# Patient Record
Sex: FEMALE | Race: WHITE | NOT HISPANIC OR LATINO | URBAN - METROPOLITAN AREA
[De-identification: names, ages, dates, MRNs, and addresses within clinical notes are randomized per-mention and may not be internally consistent; named-entity substitution may affect disease eponyms.]

---

## 2020-06-18 ENCOUNTER — OFFICE VISIT (OUTPATIENT)
Dept: URBAN - METROPOLITAN AREA CLINIC 128 | Facility: CLINIC | Age: 20
End: 2020-06-18
Payer: COMMERCIAL

## 2020-06-18 DIAGNOSIS — K31.84 GASTROPARESIS: ICD-10-CM

## 2020-06-18 DIAGNOSIS — R11.2 NAUSEA & VOMITING: ICD-10-CM

## 2020-06-18 DIAGNOSIS — R10.84 ABDOMINAL CRAMPING, GENERALIZED: ICD-10-CM

## 2020-06-18 DIAGNOSIS — K55.1 SUPERIOR MESENTERIC ARTERY SYNDROME: ICD-10-CM

## 2020-06-18 PROCEDURE — 99204 OFFICE O/P NEW MOD 45 MIN: CPT | Performed by: INTERNAL MEDICINE

## 2020-06-18 PROCEDURE — 99244 OFF/OP CNSLTJ NEW/EST MOD 40: CPT | Performed by: INTERNAL MEDICINE

## 2020-06-18 RX ORDER — ONDANSETRON 4 MG/1
1 TABLET ON THE TONGUE AND ALLOW TO DISSOLVE TABLET, ORALLY DISINTEGRATING ORAL ONCE A DAY
Status: ACTIVE | COMMUNITY

## 2020-06-18 RX ORDER — CLONAZEPAM 0.5 MG/1
1 TABLET AT BEDTIME TABLET ORAL ONCE A DAY
Status: ACTIVE | COMMUNITY

## 2020-06-18 RX ORDER — PANTOPRAZOLE SODIUM 40 MG/1
1 TABLET TABLET, DELAYED RELEASE ORAL ONCE A DAY
Status: ACTIVE | COMMUNITY

## 2020-06-18 RX ORDER — HYOSCYAMINE SULFATE 0.12 MG/1
1 TABLET UNDER THE TONGUE AND ALLOW TO DISSOLVE  AS NEEDED TABLET, ORALLY DISINTEGRATING ORAL THREE TIMES A DAY
Qty: 30 | Refills: 1 | OUTPATIENT
Start: 2020-06-18

## 2020-06-18 NOTE — HPI-TODAY'S VISIT:
hx of abd pain years ago. diagnosed with sma syndrome and was told to gain weight she was up to 120 and was asymptomatic  recently had issues with n/v with hospitalization at [facility] x 3 days she had a BS and was told she has gastroparesis hx of psbo in the past she was not started on any promotility agent recent work up with normal h/h at 15.1/44.6.  normal lft.  cta with no obvious sma obstruction/stricture she was given reglan but had severe restlessness/muscle spasm with it and stopped it  currently feeling well eats small meals and notes no n/v no abd pain today. she is up 5 lb since occ constipation but mostly normal stools.   no recent nsaid use  no over gi bleed.

## 2020-07-10 ENCOUNTER — LAB OUTSIDE AN ENCOUNTER (OUTPATIENT)
Dept: URBAN - METROPOLITAN AREA CLINIC 80 | Facility: CLINIC | Age: 20
End: 2020-07-10

## 2020-07-31 ENCOUNTER — OFFICE VISIT (OUTPATIENT)
Dept: URBAN - METROPOLITAN AREA SURGERY CENTER 31 | Facility: SURGERY CENTER | Age: 20
End: 2020-07-31

## 2020-07-31 ENCOUNTER — OFFICE VISIT (OUTPATIENT)
Dept: URBAN - METROPOLITAN AREA SURGERY CENTER 31 | Facility: SURGERY CENTER | Age: 20
End: 2020-07-31
Payer: COMMERCIAL

## 2020-07-31 DIAGNOSIS — K29.60 ADENOPAPILLOMATOSIS GASTRICA: ICD-10-CM

## 2020-07-31 DIAGNOSIS — R11.2 ACUTE NAUSEA WITH NONBILIOUS VOMITING: ICD-10-CM

## 2020-07-31 PROCEDURE — 43239 EGD BIOPSY SINGLE/MULTIPLE: CPT | Performed by: INTERNAL MEDICINE

## 2020-07-31 PROCEDURE — G8907 PT DOC NO EVENTS ON DISCHARG: HCPCS | Performed by: INTERNAL MEDICINE

## 2020-08-09 ENCOUNTER — WEB ENCOUNTER (OUTPATIENT)
Dept: URBAN - METROPOLITAN AREA CLINIC 128 | Facility: CLINIC | Age: 20
End: 2020-08-09

## 2020-08-17 ENCOUNTER — OFFICE VISIT (OUTPATIENT)
Dept: URBAN - METROPOLITAN AREA CLINIC 128 | Facility: CLINIC | Age: 20
End: 2020-08-17
Payer: COMMERCIAL

## 2020-08-17 ENCOUNTER — WEB ENCOUNTER (OUTPATIENT)
Dept: URBAN - METROPOLITAN AREA CLINIC 128 | Facility: CLINIC | Age: 20
End: 2020-08-17

## 2020-08-17 DIAGNOSIS — K92.1 HEMATOCHEZIA: ICD-10-CM

## 2020-08-17 DIAGNOSIS — I77.1 SMA STENOSIS: ICD-10-CM

## 2020-08-17 DIAGNOSIS — K59.01 CONSTIPATION BY DELAYED COLONIC TRANSIT: ICD-10-CM

## 2020-08-17 DIAGNOSIS — R11.2 NAUSEA AND VOMITING: ICD-10-CM

## 2020-08-17 PROCEDURE — G9903 PT SCRN TBCO ID AS NON USER: HCPCS | Performed by: INTERNAL MEDICINE

## 2020-08-17 PROCEDURE — 99214 OFFICE O/P EST MOD 30 MIN: CPT | Performed by: INTERNAL MEDICINE

## 2020-08-17 PROCEDURE — G8420 CALC BMI NORM PARAMETERS: HCPCS | Performed by: INTERNAL MEDICINE

## 2020-08-17 PROCEDURE — G8427 DOCREV CUR MEDS BY ELIG CLIN: HCPCS | Performed by: INTERNAL MEDICINE

## 2020-08-17 RX ORDER — ONDANSETRON 4 MG/1
1 TABLET ON THE TONGUE AND ALLOW TO DISSOLVE TABLET, ORALLY DISINTEGRATING ORAL ONCE A DAY
Status: ACTIVE | COMMUNITY

## 2020-08-17 RX ORDER — CLONAZEPAM 0.5 MG/1
1 TABLET AT BEDTIME TABLET ORAL ONCE A DAY
Status: DISCONTINUED | COMMUNITY

## 2020-08-17 RX ORDER — HYOSCYAMINE SULFATE 0.12 MG/1
1 TABLET UNDER THE TONGUE AND ALLOW TO DISSOLVE  AS NEEDED TABLET, ORALLY DISINTEGRATING ORAL THREE TIMES A DAY
Qty: 30 | Refills: 1 | Status: DISCONTINUED | COMMUNITY
Start: 2020-06-18

## 2020-08-17 RX ORDER — PANTOPRAZOLE SODIUM 40 MG/1
1 TABLET TABLET, DELAYED RELEASE ORAL ONCE A DAY
Status: ACTIVE | COMMUNITY

## 2020-08-17 NOTE — PHYSICAL EXAM GASTROINTESTINAL
Abdomen soft, nontender, nondistended, no masses palpable , normal bowel sounds   Liver and Spleen , no hepatomegaly present, liver edge nontender , spleen not palpable   Rectal: deferred

## 2020-08-17 NOTE — PHYSICAL EXAM CHEST:
chest wall non-tender anteriorly, breathing is unlabored without accessory muscle use, normal breath sounds anteriorly and laterally

## 2020-08-17 NOTE — HPI-TODAY'S VISIT:
Ms. Justin is a pleasant 19-year-old young woman who is seen today for evaluation of persistent rectal bleeding.  She has been followed by Dr. Guy over the past several months.  Upper endoscopy was performed by Dr. Guy 2 weeks ago.  She describes a history of abdominal pain with nausea vomiting, volume depletion in March and diagnosis with SMA syndrome.  She has gained over 10 pounds of weight since this time.  She was hospitalized in June secondary to volume depletion, nausea vomiting associated with severe constipation.  Persistent upper GI distress prompted recent upper endoscopy.  About 1 week ago she noted constipation for 3 days.  There is straining with small volume skinless bowel movements over the past 6 days and every bowel movement has been associated with some blood which is generally bright red on the tissue and also appears to be mixed with stool..  No abdominal pain or cramping.  There is a sense of abdominal bloating.  No nausea and vomiting with current issues.  No fevers or chills.  No personal or family history of inflammatory bowel disease.  No prior lower GI luminal evaluation. GERD symptoms remain intermittently refractory.

## 2020-08-17 NOTE — PHYSICAL EXAM NEUROLOGIC:
oriented to person, place and time. Grossly normal motor function and sensation, facial cranial nerves grossly normal

## 2020-08-19 LAB
HEMATOCRIT: 44
HEMOGLOBIN: 14.2
MCH: 29.6
MCHC: 32.3
MCV: 92
NRBC: (no result)
PLATELETS: 463
RBC: 4.8
RDW: 11.9
WBC: 7.3

## 2020-09-16 ENCOUNTER — OFFICE VISIT (OUTPATIENT)
Dept: URBAN - METROPOLITAN AREA CLINIC 128 | Facility: CLINIC | Age: 20
End: 2020-09-16

## 2020-10-12 ENCOUNTER — OFFICE VISIT (OUTPATIENT)
Dept: URBAN - METROPOLITAN AREA CLINIC 80 | Facility: CLINIC | Age: 20
End: 2020-10-12
Payer: COMMERCIAL

## 2020-10-12 DIAGNOSIS — K31.84 GASTROPARESIS: ICD-10-CM

## 2020-10-12 DIAGNOSIS — K92.1 HEMATOCHEZIA: ICD-10-CM

## 2020-10-12 DIAGNOSIS — K59.01 CONSTIPATION: ICD-10-CM

## 2020-10-12 DIAGNOSIS — R10.9 ABDOMINAL PAIN: ICD-10-CM

## 2020-10-12 DIAGNOSIS — K55.1 SUPERIOR MESENTERIC ARTERY SYNDROME: ICD-10-CM

## 2020-10-12 DIAGNOSIS — R11.2 NAUSEA & VOMITING: ICD-10-CM

## 2020-10-12 PROCEDURE — 99214 OFFICE O/P EST MOD 30 MIN: CPT | Performed by: INTERNAL MEDICINE

## 2020-10-12 PROCEDURE — G8420 CALC BMI NORM PARAMETERS: HCPCS | Performed by: INTERNAL MEDICINE

## 2020-10-12 PROCEDURE — G8427 DOCREV CUR MEDS BY ELIG CLIN: HCPCS | Performed by: INTERNAL MEDICINE

## 2020-10-12 PROCEDURE — G9903 PT SCRN TBCO ID AS NON USER: HCPCS | Performed by: INTERNAL MEDICINE

## 2020-10-12 PROCEDURE — G8484 FLU IMMUNIZE NO ADMIN: HCPCS | Performed by: INTERNAL MEDICINE

## 2020-10-12 RX ORDER — ONDANSETRON 4 MG/1
1 TABLET ON THE TONGUE AND ALLOW TO DISSOLVE TABLET, ORALLY DISINTEGRATING ORAL ONCE A DAY
Status: ACTIVE | COMMUNITY

## 2020-10-12 RX ORDER — PANTOPRAZOLE SODIUM 40 MG/1
1 TABLET TABLET, DELAYED RELEASE ORAL ONCE A DAY
Status: ACTIVE | COMMUNITY

## 2020-10-12 NOTE — HPI-TODAY'S VISIT:
hx of abd pain years ago. diagnosed with sma syndrome last seeni n 8/2020 by dr. singer for hematochezia attributed to constipaiton or hemorrhoids she had a cbc in 8/2020 with normal h/h. mildly high platelets   she comes in for follow up.    with regard to her sma syndrome.  she has been well since getting her weight up to 120  no constipation or hematochezia she is eating well normal appetite no nausea or vomiting she is only noting some suprapubic pain on and off x months unrelated to eating, drinking, or bm feels like an ache no fever or chills no diarrhea or constipation.  stable weight no other complaints today.    prior eval for psbo in the past cta with no obvious sma obstruction/stricture she was given reglan but had severe restlessness/muscle spasm with it and stopped it

## 2020-10-12 NOTE — PHYSICAL EXAM GASTROINTESTINAL
Abdomen , soft, mildly tender to palpation in the suprapubic area, nondistended , no guarding or rigidity , no masses palpable , normal bowel sounds , Liver and Spleen , no hepatomegaly present ,

## 2020-10-13 LAB
APPEARANCE: CLEAR
BACTERIA: (no result)
BILIRUBIN: NEGATIVE
CAST TYPE: (no result)
CASTS: (no result)
COMMENT: (no result)
CRYSTAL TYPE: (no result)
CRYSTALS: (no result)
EPITHELIAL CELLS (NON RENAL): (no result)
EPITHELIAL CELLS (RENAL): (no result)
GLUCOSE: NEGATIVE
KETONES: NEGATIVE
MICROSCOPIC EXAMINATION: (no result)
MICROSCOPIC EXAMINATION: (no result)
MUCUS THREADS: PRESENT
NITRITE, URINE: NEGATIVE
OCCULT BLOOD: NEGATIVE
PH: 6.5
PROTEIN: NEGATIVE
RBC: (no result)
SPECIFIC GRAVITY: 1.02
TRICHOMONAS: (no result)
URINE-COLOR: YELLOW
UROBILINOGEN,SEMI-QN: 0.2
WBC ESTERASE: NEGATIVE
WBC: (no result)
YEAST: (no result)

## 2020-10-28 ENCOUNTER — WEB ENCOUNTER (OUTPATIENT)
Dept: URBAN - METROPOLITAN AREA CLINIC 128 | Facility: CLINIC | Age: 20
End: 2020-10-28

## 2020-10-30 ENCOUNTER — TELEPHONE ENCOUNTER (OUTPATIENT)
Dept: URBAN - METROPOLITAN AREA CLINIC 92 | Facility: CLINIC | Age: 20
End: 2020-10-30

## 2020-10-30 PROBLEM — 123608004 CHOLANGIECTASIS: Status: ACTIVE | Noted: 2020-10-30

## 2020-11-03 LAB
ALBUMIN: 4.9
ALKALINE PHOSPHATASE: 79
ALT (SGPT): 26
AST (SGOT): 27
BILIRUBIN, DIRECT: 0.1
BILIRUBIN, TOTAL: 0.3
PROTEIN, TOTAL: 7.5

## 2020-11-06 ENCOUNTER — TELEPHONE ENCOUNTER (OUTPATIENT)
Dept: URBAN - METROPOLITAN AREA CLINIC 80 | Facility: CLINIC | Age: 20
End: 2020-11-06

## 2020-11-09 ENCOUNTER — TELEPHONE ENCOUNTER (OUTPATIENT)
Dept: URBAN - METROPOLITAN AREA CLINIC 92 | Facility: CLINIC | Age: 20
End: 2020-11-09

## 2020-11-09 ENCOUNTER — TELEPHONE ENCOUNTER (OUTPATIENT)
Dept: URBAN - METROPOLITAN AREA CLINIC 128 | Facility: CLINIC | Age: 20
End: 2020-11-09

## 2020-11-13 ENCOUNTER — TELEPHONE ENCOUNTER (OUTPATIENT)
Dept: URBAN - METROPOLITAN AREA CLINIC 92 | Facility: CLINIC | Age: 20
End: 2020-11-13

## 2020-11-13 ENCOUNTER — TELEPHONE ENCOUNTER (OUTPATIENT)
Dept: URBAN - METROPOLITAN AREA CLINIC 19 | Facility: CLINIC | Age: 20
End: 2020-11-13

## 2020-11-19 ENCOUNTER — TELEPHONE ENCOUNTER (OUTPATIENT)
Dept: URBAN - METROPOLITAN AREA CLINIC 6 | Facility: CLINIC | Age: 20
End: 2020-11-19

## 2020-11-19 ENCOUNTER — TELEPHONE ENCOUNTER (OUTPATIENT)
Dept: URBAN - METROPOLITAN AREA CLINIC 92 | Facility: CLINIC | Age: 20
End: 2020-11-19

## 2020-11-30 ENCOUNTER — LAB OUTSIDE AN ENCOUNTER (OUTPATIENT)
Dept: URBAN - METROPOLITAN AREA CLINIC 80 | Facility: CLINIC | Age: 20
End: 2020-11-30

## 2020-12-02 ENCOUNTER — TELEPHONE ENCOUNTER (OUTPATIENT)
Dept: URBAN - METROPOLITAN AREA CLINIC 92 | Facility: CLINIC | Age: 20
End: 2020-12-02

## 2020-12-07 ENCOUNTER — OFFICE VISIT (OUTPATIENT)
Dept: URBAN - METROPOLITAN AREA CLINIC 80 | Facility: CLINIC | Age: 20
End: 2020-12-07
Payer: COMMERCIAL

## 2020-12-07 DIAGNOSIS — K31.84 GASTROPARESIS: ICD-10-CM

## 2020-12-07 DIAGNOSIS — K21.9 GERD: ICD-10-CM

## 2020-12-07 DIAGNOSIS — K92.1 HEMATOCHEZIA: ICD-10-CM

## 2020-12-07 DIAGNOSIS — R11.2 NAUSEA & VOMITING: ICD-10-CM

## 2020-12-07 DIAGNOSIS — K55.1 SUPERIOR MESENTERIC ARTERY SYNDROME: ICD-10-CM

## 2020-12-07 DIAGNOSIS — K59.01 CONSTIPATION: ICD-10-CM

## 2020-12-07 DIAGNOSIS — R10.9 ABDOMINAL PAIN: ICD-10-CM

## 2020-12-07 PROCEDURE — G8418 CALC BMI BLW LOW PARAM F/U: HCPCS | Performed by: INTERNAL MEDICINE

## 2020-12-07 PROCEDURE — G9903 PT SCRN TBCO ID AS NON USER: HCPCS | Performed by: INTERNAL MEDICINE

## 2020-12-07 PROCEDURE — G8427 DOCREV CUR MEDS BY ELIG CLIN: HCPCS | Performed by: INTERNAL MEDICINE

## 2020-12-07 PROCEDURE — 99214 OFFICE O/P EST MOD 30 MIN: CPT | Performed by: INTERNAL MEDICINE

## 2020-12-07 PROCEDURE — G8482 FLU IMMUNIZE ORDER/ADMIN: HCPCS | Performed by: INTERNAL MEDICINE

## 2020-12-07 RX ORDER — SUCRALFATE 1 G/1
1 TABLET ON AN EMPTY STOMACH TABLET ORAL
Qty: 180 | Refills: 2 | OUTPATIENT
Start: 2020-12-07

## 2020-12-07 RX ORDER — ONDANSETRON 4 MG/1
1 TABLET ON THE TONGUE AND ALLOW TO DISSOLVE TABLET, ORALLY DISINTEGRATING ORAL ONCE A DAY
Status: ACTIVE | COMMUNITY

## 2020-12-07 RX ORDER — PANTOPRAZOLE SODIUM 40 MG/1
1 TABLET TABLET, DELAYED RELEASE ORAL ONCE A DAY
Status: ACTIVE | COMMUNITY

## 2021-01-11 ENCOUNTER — OFFICE VISIT (OUTPATIENT)
Dept: URBAN - METROPOLITAN AREA CLINIC 80 | Facility: CLINIC | Age: 21
End: 2021-01-11

## 2021-01-19 ENCOUNTER — WEB ENCOUNTER (OUTPATIENT)
Dept: URBAN - METROPOLITAN AREA CLINIC 128 | Facility: CLINIC | Age: 21
End: 2021-01-19

## 2021-03-05 ENCOUNTER — OFFICE VISIT (OUTPATIENT)
Dept: URBAN - METROPOLITAN AREA SURGERY CENTER 31 | Facility: SURGERY CENTER | Age: 21
End: 2021-03-05
Payer: COMMERCIAL

## 2021-03-05 DIAGNOSIS — K63.89 BACTERIAL OVERGROWTH SYNDROME: ICD-10-CM

## 2021-03-05 DIAGNOSIS — K59.09 CHRONIC CONSTIPATION: ICD-10-CM

## 2021-03-05 PROCEDURE — G8907 PT DOC NO EVENTS ON DISCHARG: HCPCS | Performed by: INTERNAL MEDICINE

## 2021-03-05 PROCEDURE — 45380 COLONOSCOPY AND BIOPSY: CPT | Performed by: INTERNAL MEDICINE

## 2021-03-08 ENCOUNTER — OFFICE VISIT (OUTPATIENT)
Dept: URBAN - METROPOLITAN AREA CLINIC 80 | Facility: CLINIC | Age: 21
End: 2021-03-08

## 2021-03-22 ENCOUNTER — OFFICE VISIT (OUTPATIENT)
Dept: URBAN - METROPOLITAN AREA CLINIC 80 | Facility: CLINIC | Age: 21
End: 2021-03-22
Payer: COMMERCIAL

## 2021-03-22 DIAGNOSIS — R11.2 NAUSEA & VOMITING: ICD-10-CM

## 2021-03-22 DIAGNOSIS — K59.01 CONSTIPATION: ICD-10-CM

## 2021-03-22 DIAGNOSIS — K92.1 HEMATOCHEZIA: ICD-10-CM

## 2021-03-22 DIAGNOSIS — R10.9 ABDOMINAL PAIN: ICD-10-CM

## 2021-03-22 DIAGNOSIS — K55.1 SUPERIOR MESENTERIC ARTERY SYNDROME: ICD-10-CM

## 2021-03-22 DIAGNOSIS — K31.84 GASTROPARESIS: ICD-10-CM

## 2021-03-22 DIAGNOSIS — K21.9 GERD: ICD-10-CM

## 2021-03-22 PROBLEM — 14760008 CONSTIPATION: Status: ACTIVE | Noted: 2020-10-12

## 2021-03-22 PROBLEM — 235675006 GASTROPARESIS: Status: ACTIVE | Noted: 2020-06-18

## 2021-03-22 PROBLEM — 235595009 GASTROESOPHAGEAL REFLUX DISEASE: Status: ACTIVE | Noted: 2020-12-07

## 2021-03-22 PROBLEM — 197006009 SUPERIOR MESENTERIC ARTERY SYNDROME: Status: ACTIVE | Noted: 2021-03-22

## 2021-03-22 PROCEDURE — 99214 OFFICE O/P EST MOD 30 MIN: CPT | Performed by: INTERNAL MEDICINE

## 2021-03-22 RX ORDER — SUCRALFATE 1 G/1
1 TABLET ON AN EMPTY STOMACH TABLET ORAL
Qty: 180 | Refills: 2 | Status: ON HOLD | COMMUNITY
Start: 2020-12-07

## 2021-03-22 RX ORDER — PANTOPRAZOLE SODIUM 40 MG/1
1 TABLET TABLET, DELAYED RELEASE ORAL ONCE A DAY
Status: ACTIVE | COMMUNITY

## 2021-03-22 RX ORDER — ONDANSETRON 4 MG/1
1 TABLET ON THE TONGUE AND ALLOW TO DISSOLVE TABLET, ORALLY DISINTEGRATING ORAL ONCE A DAY
Status: ON HOLD | COMMUNITY

## 2021-03-22 NOTE — HPI-TODAY'S VISIT:
hx of abd pain years ago. diagnosed with sma syndrome prior issues with hematochezia attributed to constipation and hemorrhoids she had a cbc in 8/2020 with normal h/h. mildly high platelets  she had a ct and MRI/MRCP with results reviewed with ms. puente she had a colonsocopy in 3/5/2021 with normal ti/colon biopsies she comes in for follow up.    with regard to her sma syndrome.  she has been well since getting her weight up to 123 today she has seen the nutritionist she is feeling well today bowels moving with fiber no abd pain no gerd no n/v she is eating well normal appetite no diarrhea or constipation.   no other complaints today.    prior eval for psbo in the past   cta with no obvious sma obstruction/stricture  ct/mri/mrcp in 11/2020 reviewed with pt.   she was given reglan but had severe restlessness/muscle spasm with it and stopped it

## 2021-07-29 ENCOUNTER — TELEPHONE ENCOUNTER (OUTPATIENT)
Dept: URBAN - METROPOLITAN AREA CLINIC 80 | Facility: CLINIC | Age: 21
End: 2021-07-29

## 2021-07-30 ENCOUNTER — OFFICE VISIT (OUTPATIENT)
Dept: URBAN - METROPOLITAN AREA CLINIC 126 | Facility: CLINIC | Age: 21
End: 2021-07-30
Payer: COMMERCIAL

## 2021-07-30 ENCOUNTER — LAB OUTSIDE AN ENCOUNTER (OUTPATIENT)
Dept: URBAN - METROPOLITAN AREA CLINIC 126 | Facility: CLINIC | Age: 21
End: 2021-07-30

## 2021-07-30 DIAGNOSIS — R11.0 NAUSEA: ICD-10-CM

## 2021-07-30 DIAGNOSIS — L29.9 ITCHING: ICD-10-CM

## 2021-07-30 DIAGNOSIS — R74.8 ELEVATED LIVER ENZYMES: ICD-10-CM

## 2021-07-30 DIAGNOSIS — R17 TOTAL BILIRUBIN, ELEVATED: ICD-10-CM

## 2021-07-30 PROCEDURE — 99214 OFFICE O/P EST MOD 30 MIN: CPT | Performed by: INTERNAL MEDICINE

## 2021-07-30 RX ORDER — ONDANSETRON 4 MG/1
1 TABLET ON THE TONGUE AND ALLOW TO DISSOLVE TABLET, ORALLY DISINTEGRATING ORAL ONCE A DAY
Status: ON HOLD | COMMUNITY

## 2021-07-30 RX ORDER — PANTOPRAZOLE SODIUM 40 MG/1
1 TABLET TABLET, DELAYED RELEASE ORAL ONCE A DAY
Status: ACTIVE | COMMUNITY

## 2021-07-30 RX ORDER — HYDROXYZINE PAMOATE 25 MG/1
1 CAPSULE AS NEEDED FOR ITCHING CAPSULE ORAL
Qty: 20 TABLET | Refills: 0 | OUTPATIENT
Start: 2021-07-30

## 2021-07-30 RX ORDER — SUCRALFATE 1 G/1
1 TABLET ON AN EMPTY STOMACH TABLET ORAL
Qty: 180 | Refills: 2 | Status: ON HOLD | COMMUNITY
Start: 2020-12-07

## 2021-07-30 NOTE — HPI-TODAY'S VISIT:
Very 20-year-old female seen today for elevated liver enzymes.  Her symptoms began about 2 weeks ago with left upper quadrant pain then when she would urinate she would have right upper quadrant discomfort.  She does have associated nausea more than her usual with gastroparesis.  She is mildly jaundiced and does complain of itching.  prior to this she did go to [place] and had shrimp. no other seafood. She has a history of psoriasis SMA syndrome nutcracker esophagus and gastroparesis. She had CT A/P 10/2020 for abdominal pains-notable for dil CBD. Then had MRI/MRCP with no biliary ductal dilation. did noted small T2 hyperintense cyst in pancreas 6x4mm.  bili 3.4, alk phos 172, AST/ALT 98/368 albumin 4.4, , no anemia

## 2021-07-31 LAB
BILIRUBIN, DIRECT: 2.04
BILIRUBIN, INDIRECT: 0.86
BILIRUBIN, TOTAL: 2.9

## 2021-08-03 ENCOUNTER — TELEPHONE ENCOUNTER (OUTPATIENT)
Dept: URBAN - METROPOLITAN AREA CLINIC 2 | Facility: CLINIC | Age: 21
End: 2021-08-03

## 2021-08-03 ENCOUNTER — LAB OUTSIDE AN ENCOUNTER (OUTPATIENT)
Dept: URBAN - METROPOLITAN AREA CLINIC 92 | Facility: CLINIC | Age: 21
End: 2021-08-03

## 2021-08-03 ENCOUNTER — TELEPHONE ENCOUNTER (OUTPATIENT)
Dept: URBAN - METROPOLITAN AREA CLINIC 92 | Facility: CLINIC | Age: 21
End: 2021-08-03

## 2021-08-03 LAB
AAT, DNA ANALYSIS: (no result)
ACTIN (SMOOTH MUSCLE) ANTIBODY: 8
ADDITIONAL INFORMATION:: (no result)
CERULOPLASMIN: 48
FERRITIN, SERUM: 256
HBSAG SCREEN: NEGATIVE
HEP A AB, IGM: NEGATIVE
HEP B CORE AB, IGM: NEGATIVE
HEP C VIRUS AB: <0.1
Lab: (no result)
Lab: (no result)
MITOCHONDRIAL (M2) ANTIBODY: <20
REQUEST PROBLEM: (no result)
REQUEST PROBLEM: (no result)

## 2021-08-03 RX ORDER — PANTOPRAZOLE SODIUM 40 MG/1
1 TABLET TABLET, DELAYED RELEASE ORAL ONCE A DAY
Qty: 90 | Refills: 0

## 2021-08-13 ENCOUNTER — LAB OUTSIDE AN ENCOUNTER (OUTPATIENT)
Dept: URBAN - METROPOLITAN AREA CLINIC 126 | Facility: CLINIC | Age: 21
End: 2021-08-13

## 2021-08-15 LAB
A/G RATIO: 1.4
ALBUMIN: 4.5
ALKALINE PHOSPHATASE: 142
ALT (SGPT): 314
AST (SGOT): 100
BILIRUBIN, TOTAL: 0.9
BUN/CREATININE RATIO: 16
BUN: 12
CALCIUM: 9.7
CARBON DIOXIDE, TOTAL: 23
CHLORIDE: 101
CREATININE: 0.76
EGFR IF AFRICN AM: 131
EGFR IF NONAFRICN AM: 113
GLOBULIN, TOTAL: 3.3
GLUCOSE: 84
POTASSIUM: 4.8
PROTEIN, TOTAL: 7.8
SODIUM: 139

## 2021-08-17 ENCOUNTER — TELEPHONE ENCOUNTER (OUTPATIENT)
Dept: URBAN - METROPOLITAN AREA CLINIC 2 | Facility: CLINIC | Age: 21
End: 2021-08-17

## 2021-08-23 ENCOUNTER — TELEPHONE ENCOUNTER (OUTPATIENT)
Dept: URBAN - METROPOLITAN AREA CLINIC 92 | Facility: CLINIC | Age: 21
End: 2021-08-23

## 2021-09-20 ENCOUNTER — OFFICE VISIT (OUTPATIENT)
Dept: URBAN - METROPOLITAN AREA CLINIC 80 | Facility: CLINIC | Age: 21
End: 2021-09-20
Payer: COMMERCIAL

## 2021-09-20 ENCOUNTER — WEB ENCOUNTER (OUTPATIENT)
Dept: URBAN - METROPOLITAN AREA CLINIC 80 | Facility: CLINIC | Age: 21
End: 2021-09-20

## 2021-09-20 VITALS
HEART RATE: 93 BPM | DIASTOLIC BLOOD PRESSURE: 63 MMHG | BODY MASS INDEX: 21.09 KG/M2 | HEIGHT: 65 IN | TEMPERATURE: 97.3 F | SYSTOLIC BLOOD PRESSURE: 99 MMHG | WEIGHT: 126.6 LBS

## 2021-09-20 DIAGNOSIS — R10.11 RUQ PAIN: ICD-10-CM

## 2021-09-20 DIAGNOSIS — R74.8 ELEVATED LIVER ENZYMES: ICD-10-CM

## 2021-09-20 PROCEDURE — 99214 OFFICE O/P EST MOD 30 MIN: CPT | Performed by: INTERNAL MEDICINE

## 2021-09-20 RX ORDER — HYDROXYZINE PAMOATE 25 MG/1
1 CAPSULE AS NEEDED FOR ITCHING CAPSULE ORAL
Qty: 20 TABLET | Refills: 0 | Status: ACTIVE | COMMUNITY
Start: 2021-07-30

## 2021-09-20 RX ORDER — PANTOPRAZOLE SODIUM 40 MG/1
1 TABLET TABLET, DELAYED RELEASE ORAL ONCE A DAY
Qty: 90 | Refills: 0 | Status: ACTIVE | COMMUNITY

## 2021-09-20 RX ORDER — SUCRALFATE 1 G/1
1 TABLET ON AN EMPTY STOMACH TABLET ORAL
Qty: 180 | Refills: 2 | COMMUNITY
Start: 2020-12-07

## 2021-09-20 RX ORDER — ONDANSETRON 4 MG/1
1 TABLET ON THE TONGUE AND ALLOW TO DISSOLVE TABLET, ORALLY DISINTEGRATING ORAL ONCE A DAY
COMMUNITY

## 2021-09-20 NOTE — HPI-TODAY'S VISIT:
Very 20-year-old female seen today for elevated liver enzymes.   last seen in 7/2021 had symptomso f abd pain with elevated lft's hida previously orderd but not done mrcp in 8/2021 with biliary beading concerning for cholangitis vs pbc vs psc prior normal ama had elevated lft's in 8/13/2021 with alt/ast of 314/100.  alk phos of 142.  normal bili she had left upper quadrant pain worse with urination today she is asymptomatic no abd pain no itching normal appetite lft's in 9/3/2021 with normal alt/ast of 25/23.  normal bili and alk phos  She has a history of psoriasis SMA syndrome nutcracker esophagus and gastroparesis.  She had CT A/P 10/2020 for abdominal pains-notable for dil CBD.  Then had MRI/MRCP with no biliary ductal dilation.  did noted small T2 hyperintense cyst in pancreas 6x4mm.  no new complaints

## 2021-12-07 ENCOUNTER — OFFICE VISIT (OUTPATIENT)
Dept: URBAN - METROPOLITAN AREA CLINIC 80 | Facility: CLINIC | Age: 21
End: 2021-12-07
Payer: COMMERCIAL

## 2021-12-07 VITALS
SYSTOLIC BLOOD PRESSURE: 118 MMHG | HEART RATE: 93 BPM | HEIGHT: 65 IN | BODY MASS INDEX: 20.66 KG/M2 | WEIGHT: 124 LBS | TEMPERATURE: 98.4 F | DIASTOLIC BLOOD PRESSURE: 75 MMHG

## 2021-12-07 DIAGNOSIS — R10.11 RUQ PAIN: ICD-10-CM

## 2021-12-07 DIAGNOSIS — R74.8 ELEVATED LIVER ENZYMES: ICD-10-CM

## 2021-12-07 PROCEDURE — 99213 OFFICE O/P EST LOW 20 MIN: CPT | Performed by: INTERNAL MEDICINE

## 2021-12-07 RX ORDER — ONDANSETRON 4 MG/1
1 TABLET ON THE TONGUE AND ALLOW TO DISSOLVE TABLET, ORALLY DISINTEGRATING ORAL ONCE A DAY
Status: ACTIVE | COMMUNITY

## 2021-12-07 RX ORDER — SUCRALFATE 1 G/1
1 TABLET ON AN EMPTY STOMACH TABLET ORAL
Qty: 180 | Refills: 2 | Status: ACTIVE | COMMUNITY
Start: 2020-12-07

## 2021-12-07 RX ORDER — PANTOPRAZOLE SODIUM 40 MG/1
1 TABLET TABLET, DELAYED RELEASE ORAL ONCE A DAY
Qty: 90 | Refills: 0 | Status: ON HOLD | COMMUNITY

## 2021-12-07 RX ORDER — HYDROXYZINE PAMOATE 25 MG/1
1 CAPSULE AS NEEDED FOR ITCHING CAPSULE ORAL
Qty: 20 TABLET | Refills: 0 | Status: ACTIVE | COMMUNITY
Start: 2021-07-30

## 2021-12-07 NOTE — HPI-TODAY'S VISIT:
Very 20-year-old female seen today for elevated liver enzymes.    last seen in 9/2021 had symptoms of abd pain with elevated lft's hida previously orderd but not done mrcp in 8/2021 with biliary beading concerning for cholangitis vs pbc vs psc prior normal ama had elevated lft's in 8/13/2021 with alt/ast of 314/100.  alk phos of 142.  normal bili she had left upper quadrant pain worse with urination that has since resolved liver biopsy in 10/11/2021 with normal architecture and no steatosis.  no noted signs of psc/pbc lft's in 9/3/2021 with normal alt/ast of 25/23.  normal bili and alk phos here for follow up  hida not done today she is asymptomatic denies any abd pain no n/v normal appetite stable weight no itching no jaundice normal stools no other complaints  She has a history of psoriasis SMA syndrome nutcracker esophagus and gastroparesis.  She had CT A/P 10/2020 for abdominal pains-notable for dil CBD.  Then had MRI/MRCP with no biliary ductal dilation.  did noted small T2 hyperintense cyst in pancreas 6x4mm.

## 2021-12-08 LAB
ALBUMIN: 4.7
ALKALINE PHOSPHATASE: 95
ALT (SGPT): 41
AST (SGOT): 38
BILIRUBIN, DIRECT: 0.16
BILIRUBIN, TOTAL: 0.5
PROTEIN, TOTAL: 7.6

## 2022-05-23 ENCOUNTER — DASHBOARD ENCOUNTERS (OUTPATIENT)
Age: 22
End: 2022-05-23

## 2022-06-05 ENCOUNTER — WEB ENCOUNTER (OUTPATIENT)
Dept: URBAN - METROPOLITAN AREA CLINIC 80 | Facility: CLINIC | Age: 22
End: 2022-06-05

## 2022-06-07 ENCOUNTER — OFFICE VISIT (OUTPATIENT)
Dept: URBAN - METROPOLITAN AREA CLINIC 80 | Facility: CLINIC | Age: 22
End: 2022-06-07

## 2022-06-11 LAB
ALBUMIN: 4.8
ALKALINE PHOSPHATASE: 149
ALT (SGPT): 330
AST (SGOT): 134
BILIRUBIN, DIRECT: 0.62
BILIRUBIN, TOTAL: 1
PROTEIN, TOTAL: 7.8

## 2022-06-13 ENCOUNTER — OFFICE VISIT (OUTPATIENT)
Dept: URBAN - METROPOLITAN AREA CLINIC 80 | Facility: CLINIC | Age: 22
End: 2022-06-13
Payer: COMMERCIAL

## 2022-06-13 VITALS
TEMPERATURE: 97.5 F | DIASTOLIC BLOOD PRESSURE: 70 MMHG | SYSTOLIC BLOOD PRESSURE: 110 MMHG | WEIGHT: 119.2 LBS | HEART RATE: 68 BPM | HEIGHT: 65 IN | BODY MASS INDEX: 19.86 KG/M2

## 2022-06-13 DIAGNOSIS — R74.8 ELEVATED LIVER ENZYMES: ICD-10-CM

## 2022-06-13 DIAGNOSIS — R10.11 RUQ PAIN: ICD-10-CM

## 2022-06-13 DIAGNOSIS — R11.0 NAUSEA: ICD-10-CM

## 2022-06-13 PROCEDURE — 99214 OFFICE O/P EST MOD 30 MIN: CPT | Performed by: INTERNAL MEDICINE

## 2022-06-13 RX ORDER — SUCRALFATE 1 G/1
1 TABLET ON AN EMPTY STOMACH TABLET ORAL
Qty: 180 | Refills: 2 | Status: ACTIVE | COMMUNITY
Start: 2020-12-07

## 2022-06-13 RX ORDER — ONDANSETRON 4 MG/1
1 TABLET ON THE TONGUE AND ALLOW TO DISSOLVE TABLET, ORALLY DISINTEGRATING ORAL ONCE A DAY
Status: ACTIVE | COMMUNITY

## 2022-06-13 RX ORDER — PANTOPRAZOLE SODIUM 40 MG/1
1 TABLET TABLET, DELAYED RELEASE ORAL ONCE A DAY
Qty: 90 | Refills: 0 | Status: ON HOLD | COMMUNITY

## 2022-06-13 RX ORDER — ONDANSETRON 4 MG/1
1 TABLET ON THE TONGUE AND ALLOW TO DISSOLVE TABLET, ORALLY DISINTEGRATING ORAL ONCE A DAY
Qty: 60 | Refills: 1

## 2022-06-13 RX ORDER — HYDROXYZINE PAMOATE 25 MG/1
1 CAPSULE AS NEEDED FOR ITCHING CAPSULE ORAL
Qty: 20 TABLET | Refills: 0 | Status: ACTIVE | COMMUNITY
Start: 2021-07-30

## 2022-06-13 NOTE — HPI-TODAY'S VISIT:
Very pleasant 21-year-old female seen today for elevated liver enzymes.     previoulsly followed for elevated lft's with unremarkable work up and normal lft's in 12/2021 ( alt 41) had symptoms of abd pain with elevated lft's in 7/2021 hida previously orderd but not done mrcp in 8/2021 with biliary beading concerning for cholangitis vs pbc vs psc prior normal ama had elevated lft's in 8/13/2021 with alt/ast of 314/100.  alk phos of 142.  normal bili liver biopsy in 10/11/2021 with normal architecture and no steatosis.  no noted signs of psc/pbc lft's in 9/3/2021 with normal alt/ast of 25/23.  normal bili and alk phos here for recurrent flare  notes recent issues with diarrhea a couple of weeks ago assoc fatigue went to urgent care and had a positive strep throat test but no stool test was done she was given abx for strep throat after 5-7 days of antibiotics, she notes yellow eyes and itching we had labs done on 6/6/2022 which resulted 2 days ago alt of 330, ast of 134.  total bili of 1.  direct bilirubin elevated at 0.62.  alk phos high at 149 she notes resolution of her diarrhea now as well as her itching and jaundice still notest fatigue rare nausea but no vomiting normal appetite normal appetite stable weight no itching denies any recent heavy alcohol use.   no other complaints  She has a history of psoriasis SMA syndrome nutcracker esophagus and gastroparesis.  She had CT A/P 10/2020 for abdominal pains-notable for dil CBD.  Then had MRI/MRCP with no biliary ductal dilation.  did noted small T2 hyperintense cyst in pancreas 6x4mm.

## 2022-06-16 LAB
HEP A AB, IGM: NEGATIVE
HEP A AB, TOTAL: POSITIVE
MITOCHONDRIAL (M2) ANTIBODY: <20

## 2022-06-27 ENCOUNTER — LAB OUTSIDE AN ENCOUNTER (OUTPATIENT)
Dept: URBAN - METROPOLITAN AREA CLINIC 80 | Facility: CLINIC | Age: 22
End: 2022-06-27

## 2022-07-05 ENCOUNTER — WEB ENCOUNTER (OUTPATIENT)
Dept: URBAN - METROPOLITAN AREA CLINIC 80 | Facility: CLINIC | Age: 22
End: 2022-07-05

## 2022-07-08 ENCOUNTER — TELEPHONE ENCOUNTER (OUTPATIENT)
Dept: URBAN - METROPOLITAN AREA CLINIC 92 | Facility: CLINIC | Age: 22
End: 2022-07-08

## 2022-07-08 ENCOUNTER — LAB OUTSIDE AN ENCOUNTER (OUTPATIENT)
Dept: URBAN - METROPOLITAN AREA CLINIC 80 | Facility: CLINIC | Age: 22
End: 2022-07-08

## 2022-07-08 RX ORDER — PANTOPRAZOLE SODIUM 40 MG/1
1 TABLET TABLET, DELAYED RELEASE ORAL ONCE A DAY
Qty: 90 | Refills: 0

## 2022-07-09 LAB
ALBUMIN: 4.8
ALKALINE PHOSPHATASE: 84
ALT (SGPT): 34
AST (SGOT): 33
BILIRUBIN, DIRECT: 0.18
BILIRUBIN, TOTAL: 0.4
PROTEIN, TOTAL: 7.6

## 2022-07-25 ENCOUNTER — LAB OUTSIDE AN ENCOUNTER (OUTPATIENT)
Dept: URBAN - METROPOLITAN AREA CLINIC 80 | Facility: CLINIC | Age: 22
End: 2022-07-25

## 2022-10-11 ENCOUNTER — WEB ENCOUNTER (OUTPATIENT)
Dept: URBAN - METROPOLITAN AREA CLINIC 80 | Facility: CLINIC | Age: 22
End: 2022-10-11

## 2022-10-14 ENCOUNTER — TELEPHONE ENCOUNTER (OUTPATIENT)
Dept: URBAN - METROPOLITAN AREA CLINIC 80 | Facility: CLINIC | Age: 22
End: 2022-10-14

## 2022-10-14 RX ORDER — PANTOPRAZOLE SODIUM 40 MG/1
1 TABLET TABLET, DELAYED RELEASE ORAL ONCE A DAY
Qty: 90 | Refills: 0

## 2022-11-04 ENCOUNTER — TELEPHONE ENCOUNTER (OUTPATIENT)
Dept: URBAN - METROPOLITAN AREA CLINIC 80 | Facility: CLINIC | Age: 22
End: 2022-11-04

## 2023-01-09 ENCOUNTER — LAB OUTSIDE AN ENCOUNTER (OUTPATIENT)
Dept: URBAN - METROPOLITAN AREA CLINIC 80 | Facility: CLINIC | Age: 23
End: 2023-01-09

## 2023-01-09 ENCOUNTER — OFFICE VISIT (OUTPATIENT)
Dept: URBAN - METROPOLITAN AREA CLINIC 80 | Facility: CLINIC | Age: 23
End: 2023-01-09
Payer: COMMERCIAL

## 2023-01-09 VITALS
TEMPERATURE: 97.6 F | SYSTOLIC BLOOD PRESSURE: 121 MMHG | WEIGHT: 120.6 LBS | BODY MASS INDEX: 20.09 KG/M2 | DIASTOLIC BLOOD PRESSURE: 79 MMHG | HEIGHT: 65 IN | HEART RATE: 84 BPM

## 2023-01-09 DIAGNOSIS — R11.0 NAUSEA: ICD-10-CM

## 2023-01-09 DIAGNOSIS — R74.8 ELEVATED LIVER ENZYMES: ICD-10-CM

## 2023-01-09 DIAGNOSIS — R10.11 RUQ PAIN: ICD-10-CM

## 2023-01-09 PROCEDURE — 99214 OFFICE O/P EST MOD 30 MIN: CPT | Performed by: INTERNAL MEDICINE

## 2023-01-09 RX ORDER — PANTOPRAZOLE SODIUM 40 MG/1
1 TABLET TABLET, DELAYED RELEASE ORAL ONCE A DAY
Qty: 90 | Refills: 0 | Status: ACTIVE | COMMUNITY

## 2023-01-09 RX ORDER — HYDROXYZINE PAMOATE 25 MG/1
1 CAPSULE AS NEEDED FOR ITCHING CAPSULE ORAL
Qty: 20 TABLET | Refills: 0 | Status: ACTIVE | COMMUNITY
Start: 2021-07-30

## 2023-01-09 RX ORDER — ONDANSETRON 4 MG/1
1 TABLET ON THE TONGUE AND ALLOW TO DISSOLVE TABLET, ORALLY DISINTEGRATING ORAL ONCE A DAY
Qty: 60 | Refills: 1 | Status: ACTIVE | COMMUNITY

## 2023-01-09 RX ORDER — SUCRALFATE 1 G/1
1 TABLET ON AN EMPTY STOMACH TABLET ORAL
Qty: 180 | Refills: 2 | Status: ACTIVE | COMMUNITY
Start: 2020-12-07

## 2023-01-09 NOTE — HPI-TODAY'S VISIT:
Very pleasant 22-year-old female seen today for elevated liver enzymes.     previously followed for elevated lft's with unremarkable work up and normal lft's in 12/2021 ( alt 41) had symptoms of abd pain with elevated lft's in 7/2021 hida previously orderd but not done mrcp in 8/2021 with biliary beading concerning for cholangitis vs pbc vs psc prior normal ama had elevated lft's in 8/13/2021 with alt/ast of 314/100.  alk phos of 142.  normal bili liver biopsy in 10/11/2021 with normal architecture and no steatosis.  no noted signs of psc/pbc lft's in 9/3/2021 with normal alt/ast of 25/23.  normal bili and alk phos repeat lft's in 7/2022 with mildly high ast at 34. normal alt, alk phose and total bili normal ama in 7/2022 here for rcurrent elevated lfts  has was well but noted episode of fatigue in 10/2022 saw her pcp with labs notable for alt/ast of 51/35.  normal alk phos at 129 and normal bili she had repeat labs in 11/2022 with alt/ast of 67/48.   she continues to have issues with fatigue no jaundice occ ruq abd pain no nausea or vomiting normal stools denies any etoh use has not been compliant with vitamin E daily normal appetite stable weight no itching normal stools  no other complaints  She has a history of psoriasis, SMA syndrome, nutcracker esophagus, and gastroparesis.  She had CT A/P 10/2020 for abdominal pains-notable for dil CBD.  Then had MRI/MRCP with no biliary ductal dilation.  some beading but normal liver biopsy and negative ama positive hep a.   did noted small T2 hyperintense cyst in pancreas 6x4mm.

## 2023-01-12 LAB
ALBUMIN/GLOBULIN RATIO: 1.5
ALBUMIN: 4.5
ALKALINE PHOSPHATASE: 78
ALT (SGPT): 26
AST (SGOT): 26
BILIRUBIN, DIRECT: 0.1
BILIRUBIN, INDIRECT: 0.2
BILIRUBIN, TOTAL: 0.3
GLOBULIN: 3
IMMUNOGLOBULIN A: 221
INTERPRETATION: (no result)
LKM-1 ANTIBODY (IGG): <=20
PROTEIN, TOTAL: 7.5
SMOOTH MUSCLE AB SCREEN: NEGATIVE
TISSUE TRANSGLUTAMINASE AB, IGA: <1
TSH W/REFLEX TO FT4: 1.04

## 2023-01-31 ENCOUNTER — WEB ENCOUNTER (OUTPATIENT)
Dept: URBAN - METROPOLITAN AREA CLINIC 80 | Facility: CLINIC | Age: 23
End: 2023-01-31

## 2023-01-31 RX ORDER — PANTOPRAZOLE SODIUM 40 MG/1
1 TABLET TABLET, DELAYED RELEASE ORAL ONCE A DAY
Qty: 90 | Refills: 0

## 2023-02-01 ENCOUNTER — ERX REFILL RESPONSE (OUTPATIENT)
Dept: URBAN - METROPOLITAN AREA CLINIC 80 | Facility: CLINIC | Age: 23
End: 2023-02-01

## 2023-02-01 RX ORDER — PANTOPRAZOLE SODIUM 40 MG/1
TAKE 1 TABLET BY MOUTH EVERY DAY FOR 90 DAYS TABLET, DELAYED RELEASE ORAL
Qty: 90 TABLET | Refills: 3 | OUTPATIENT

## 2023-02-01 RX ORDER — PANTOPRAZOLE SODIUM 40 MG/1
1 TABLET TABLET, DELAYED RELEASE ORAL ONCE A DAY
Qty: 90 | Refills: 0 | OUTPATIENT

## 2023-07-17 ENCOUNTER — TELEPHONE ENCOUNTER (OUTPATIENT)
Dept: URBAN - METROPOLITAN AREA CLINIC 6 | Facility: CLINIC | Age: 23
End: 2023-07-17

## 2023-07-24 LAB
A/G RATIO: 1.5
ALBUMIN: 4.4
ALKALINE PHOSPHATASE: 76
ALT (SGPT): 34
AST (SGOT): 37
BILIRUBIN, TOTAL: 0.4
BUN/CREATININE RATIO: (no result)
BUN: 10
C-REACTIVE PROTEIN, QUANT: 1.4
CALCIUM: 9.4
CARBON DIOXIDE, TOTAL: 26
CHLORIDE: 104
CREATININE: 0.88
EGFR: 95
GLOBULIN, TOTAL: 3
GLUCOSE: 76
POTASSIUM: 4.9
PROTEIN, TOTAL: 7.4
SED RATE BY MODIFIED: 9
SODIUM: 139

## 2024-02-25 NOTE — PHYSICAL EXAM NECK/THYROID:
normal appearance , without tenderness upon palpation , no deformities , trachea midline , Thyroid normal size , no thyroid nodules , no masses , no JVD , thyroid nontender Resulted

## 2024-05-05 ENCOUNTER — ERX REFILL RESPONSE (OUTPATIENT)
Dept: URBAN - METROPOLITAN AREA CLINIC 80 | Facility: CLINIC | Age: 24
End: 2024-05-05

## 2024-05-05 RX ORDER — PANTOPRAZOLE SODIUM 40 MG/1
TAKE 1 TABLET BY MOUTH EVERY DAY TABLET, DELAYED RELEASE ORAL
Qty: 90 TABLET | Refills: 0 | OUTPATIENT

## 2024-05-05 RX ORDER — PANTOPRAZOLE SODIUM 40 MG/1
TAKE 1 TABLET BY MOUTH EVERY DAY FOR 90 DAYS TABLET, DELAYED RELEASE ORAL
Qty: 90 TABLET | Refills: 3 | OUTPATIENT

## 2024-06-03 ENCOUNTER — OFFICE VISIT (OUTPATIENT)
Dept: URBAN - METROPOLITAN AREA CLINIC 80 | Facility: CLINIC | Age: 24
End: 2024-06-03

## 2024-06-14 ENCOUNTER — OFFICE VISIT (OUTPATIENT)
Dept: URBAN - METROPOLITAN AREA CLINIC 80 | Facility: CLINIC | Age: 24
End: 2024-06-14

## 2025-06-11 ENCOUNTER — OFFICE VISIT (OUTPATIENT)
Dept: URBAN - METROPOLITAN AREA CLINIC 19 | Facility: CLINIC | Age: 25
End: 2025-06-11
Payer: COMMERCIAL

## 2025-06-11 DIAGNOSIS — R11.0 NAUSEA: ICD-10-CM

## 2025-06-11 DIAGNOSIS — R74.8 ELEVATED ALKALINE PHOSPHATASE LEVEL: ICD-10-CM

## 2025-06-11 PROCEDURE — 99213 OFFICE O/P EST LOW 20 MIN: CPT | Performed by: INTERNAL MEDICINE

## 2025-06-11 RX ORDER — PANTOPRAZOLE SODIUM 40 MG/1
TAKE 1 TABLET BY MOUTH EVERY DAY TABLET, DELAYED RELEASE ORAL
Qty: 90 TABLET | Refills: 0 | Status: ACTIVE | COMMUNITY

## 2025-06-11 RX ORDER — ONDANSETRON 4 MG/1
1 TABLET ON THE TONGUE AND ALLOW TO DISSOLVE TABLET, ORALLY DISINTEGRATING ORAL ONCE A DAY
Qty: 60 | Refills: 1 | Status: ACTIVE | COMMUNITY

## 2025-06-11 RX ORDER — HYDROXYZINE PAMOATE 25 MG/1
1 CAPSULE AS NEEDED FOR ITCHING CAPSULE ORAL
Qty: 20 TABLET | Refills: 0 | Status: ACTIVE | COMMUNITY
Start: 2021-07-30

## 2025-06-11 RX ORDER — SUCRALFATE 1 G/1
1 TABLET ON AN EMPTY STOMACH TABLET ORAL
Qty: 180 | Refills: 2 | Status: ACTIVE | COMMUNITY
Start: 2020-12-07

## 2025-06-11 NOTE — HPI-TODAY'S VISIT:
Very pleasant 24-year-old female seen today for elevated liver enzymes.     referred by Dr. Pilar Pryor previously seen for it in 1/2023 previously followed for elevated lft's with unremarkable work up and normal lft's in 12/2021 ( ALT 41) mrcp in 8/2021 with biliary beading concerning for cholangitis vs pbc vs psc prior normal ama had elevated lft's in 8/13/2021 with alt/ast of 314/100.  alk phos of 142.  normal bili liver biopsy in 10/11/2021 with normal architecture and no steatosis.  no noted signs of psc/pbc lft's in 9/3/2021 with normal alt/ast of 25/23.  normal bili and alk phos repeat lft's in 7/2022 with mildly high ast at 34. normal alt, alk phose and total bili normal ama in 7/2022 she comes in today for recent elevated lfts done by her pcp here for rcurrent elevated lfts  pt notes recent labs with her pcp i don't have those labs but she notes lfts in the mid 30's notes no recent issues with jaundice, abd pain, or bleeding issues normal appetite wtih stable weight admits to fatigue no nausea or vomiting normal stools denies any etoh use previously was on vitamin E but hasn't been taking it over a year no itching normal stools  no other complaints  She has a history of psoriasis, SMA syndrome, nutcracker esophagus, and gastroparesis.  She had CT A/P 10/2020 for abdominal pains-notable for dil CBD.  Then had MRI/MRCP with no biliary ductal dilation.  some beading but normal liver biopsy and negative ama positive hep a.   did noted small T2 hyperintense cyst in pancreas 6x4mm.

## 2025-06-13 ENCOUNTER — OFFICE VISIT (OUTPATIENT)
Dept: URBAN - METROPOLITAN AREA CLINIC 18 | Facility: CLINIC | Age: 25
End: 2025-06-13
Payer: COMMERCIAL

## 2025-06-13 DIAGNOSIS — K80.20 CHOLELITHIASIS: ICD-10-CM

## 2025-06-13 PROCEDURE — 76705 ECHO EXAM OF ABDOMEN: CPT | Performed by: INTERNAL MEDICINE

## 2025-06-13 RX ORDER — PANTOPRAZOLE SODIUM 40 MG/1
TAKE 1 TABLET BY MOUTH EVERY DAY TABLET, DELAYED RELEASE ORAL
Qty: 90 TABLET | Refills: 0 | Status: ACTIVE | COMMUNITY

## 2025-06-13 RX ORDER — SUCRALFATE 1 G/1
1 TABLET ON AN EMPTY STOMACH TABLET ORAL
Qty: 180 | Refills: 2 | Status: ACTIVE | COMMUNITY
Start: 2020-12-07

## 2025-06-13 RX ORDER — HYDROXYZINE PAMOATE 25 MG/1
1 CAPSULE AS NEEDED FOR ITCHING CAPSULE ORAL
Qty: 20 TABLET | Refills: 0 | Status: ACTIVE | COMMUNITY
Start: 2021-07-30

## 2025-06-13 RX ORDER — ONDANSETRON 4 MG/1
1 TABLET ON THE TONGUE AND ALLOW TO DISSOLVE TABLET, ORALLY DISINTEGRATING ORAL ONCE A DAY
Qty: 60 | Refills: 1 | Status: ACTIVE | COMMUNITY

## 2025-06-23 ENCOUNTER — TELEPHONE ENCOUNTER (OUTPATIENT)
Dept: URBAN - METROPOLITAN AREA CLINIC 19 | Facility: CLINIC | Age: 25
End: 2025-06-23

## 2025-06-26 ENCOUNTER — TELEPHONE ENCOUNTER (OUTPATIENT)
Dept: URBAN - METROPOLITAN AREA CLINIC 6 | Facility: CLINIC | Age: 25
End: 2025-06-26

## 2025-06-26 RX ORDER — SUCRALFATE 1 G/1
1 TABLET ON AN EMPTY STOMACH TABLET ORAL TWICE A DAY
Qty: 60 | OUTPATIENT
Start: 2025-06-26

## 2025-06-27 LAB
ACTIN (SMOOTH MUSCLE) ANTIBODY (IGG): <20
ALBUMIN/GLOBULIN RATIO: 1.6
ALBUMIN: 4.6
ALKALINE PHOSPHATASE: 93
ALT: 33
AST: 24
BILIRUBIN, TOTAL: 0.3
BUN/CREATININE RATIO: (no result)
CALCIUM: 9.6
CARBON DIOXIDE: 30
CERULOPLASMIN: 45
CHLORIDE: 101
CREATININE: 0.83
EGFR: 101
FERRITIN, SERUM: 48
FIB 4 INDEX: 0.33
GLOBULIN: 2.9
GLUCOSE: 81
HEP BE AB: (no result)
HEP BE AG: (no result)
HEPATITIS A AB, TOTAL: REACTIVE
HEPATITIS B CORE AB TOTAL: (no result)
HEPATITIS B SURFACE AB IMMUNITY, QN: 25
HEPATITIS B SURFACE ANTIGEN: (no result)
HEPATITIS C ANTIBODY (REFL): (no result)
IRON BIND.CAP.(TIBC): 382
IRON SATURATION: 24
IRON: 92
LKM-1 ANTIBODY (IGG): <=20
MITOCHONDRIAL AB SCREEN: NEGATIVE
PLATELET COUNT: 303
POTASSIUM: 4.7
PROTEIN, TOTAL: 7.5
SODIUM: 139
UREA NITROGEN (BUN): 12

## 2025-07-01 ENCOUNTER — OFFICE VISIT (OUTPATIENT)
Dept: URBAN - METROPOLITAN AREA TELEHEALTH 2 | Facility: TELEHEALTH | Age: 25
End: 2025-07-01
Payer: COMMERCIAL

## 2025-07-01 DIAGNOSIS — K21.9 ACID REFLUX: ICD-10-CM

## 2025-07-01 DIAGNOSIS — R74.8 ELEVATED LIVER ENZYMES: ICD-10-CM

## 2025-07-01 DIAGNOSIS — R11.0 NAUSEA: ICD-10-CM

## 2025-07-01 PROBLEM — 235595009: Status: ACTIVE | Noted: 2025-07-01

## 2025-07-01 PROCEDURE — 99213 OFFICE O/P EST LOW 20 MIN: CPT | Performed by: INTERNAL MEDICINE

## 2025-07-01 RX ORDER — ONDANSETRON 4 MG/1
1 TABLET ON THE TONGUE AND ALLOW TO DISSOLVE TABLET, ORALLY DISINTEGRATING ORAL ONCE A DAY
Qty: 60 | Refills: 1 | Status: ACTIVE | COMMUNITY

## 2025-07-01 RX ORDER — PANTOPRAZOLE SODIUM 40 MG/1
TAKE 1 TABLET BY MOUTH EVERY DAY TABLET, DELAYED RELEASE ORAL
Qty: 90 TABLET | Refills: 0 | Status: ACTIVE | COMMUNITY

## 2025-07-01 RX ORDER — SUCRALFATE 1 G/1
1 TABLET ON AN EMPTY STOMACH TABLET ORAL
Qty: 180 | Refills: 2 | Status: ACTIVE | COMMUNITY
Start: 2020-12-07

## 2025-07-01 RX ORDER — HYDROXYZINE PAMOATE 25 MG/1
1 CAPSULE AS NEEDED FOR ITCHING CAPSULE ORAL
Qty: 20 TABLET | Refills: 0 | Status: ACTIVE | COMMUNITY
Start: 2021-07-30

## 2025-07-01 RX ORDER — SUCRALFATE 1 G/1
1 TABLET ON AN EMPTY STOMACH TABLET ORAL TWICE A DAY
Qty: 60 | Status: ACTIVE | COMMUNITY
Start: 2025-06-26

## 2025-07-01 NOTE — HPI-TODAY'S VISIT:
Very pleasant 24-year-old female seen today via telehealth for recent epigasric pain seen recently in 6/11/2025 for elevated liver enzymes.     referred by Dr. Pilar Pryor previously seen for it in 1/2023 previously followed for elevated lft's with unremarkable work up and normal lft's in 12/2021 ( ALT 41) mrcp in 8/2021 with biliary beading concerning for cholangitis vs pbc vs psc prior normal ama had elevated lft's in 8/13/2021 with alt/ast of 314/100.  alk phos of 142.  normal bili liver biopsy in 10/11/2021 with normal architecture and no steatosis.  no noted signs of psc/pbc lft's in 9/3/2021 with normal alt/ast of 25/23.  normal bili and alk phos repeat lft's in 7/2022 with mildly high ast at 34. normal alt, alk phose and total bili normal ama in 7/2022 labs in 6/2025 with mildly high alt at 33.  normal ast/alk phos/bili and normal work up for etiology other than signs of prior hep a exposure.   seen today via telehealth for recent abd pain  she notes onset of epigasric pain that started 1 week ago it was nonstop all day for 3 days unrelated to eating, drinking or bm did try antacid without relief denies any recent nsaid use no assoc n/v was able to eat during that time she is noting improved symptmos now with on and off symptoms less severe than last week stools are normal appetite stable weight is stable  She has a history of psoriasis, SMA syndrome, nutcracker esophagus, and gastroparesis.  She had CT A/P 10/2020 for abdominal pains-notable for dil CBD.  Then had MRI/MRCP with no biliary ductal dilation.  some beading but normal liver biopsy and negative ama positive hep a.   did noted small T2 hyperintense cyst in pancreas 6x4mm.

## 2025-07-01 NOTE — PHYSICAL EXAM GASTROINTESTINAL
Self Exam: Abdomen soft, mildly tender to palpation in the epigastric area but not worse on palpation, non-distended